# Patient Record
Sex: FEMALE | URBAN - METROPOLITAN AREA
[De-identification: names, ages, dates, MRNs, and addresses within clinical notes are randomized per-mention and may not be internally consistent; named-entity substitution may affect disease eponyms.]

---

## 2023-09-20 ENCOUNTER — APPOINTMENT (RX ONLY)
Dept: URBAN - METROPOLITAN AREA CLINIC 303 | Facility: CLINIC | Age: 33
Setting detail: DERMATOLOGY
End: 2023-09-20

## 2023-09-20 DIAGNOSIS — L98.9 DISORDER OF THE SKIN AND SUBCUTANEOUS TISSUE, UNSPECIFIED: ICD-10-CM

## 2023-09-20 PROCEDURE — 99202 OFFICE O/P NEW SF 15 MIN: CPT

## 2023-09-20 PROCEDURE — ? FULL BODY SKIN EXAM - DECLINED

## 2023-09-20 PROCEDURE — ? ADDITIONAL NOTES

## 2023-09-20 PROCEDURE — ? RECORDS REQUESTED

## 2023-09-20 PROCEDURE — ? COUNSELING

## 2023-09-20 ASSESSMENT — LOCATION DETAILED DESCRIPTION DERM
LOCATION DETAILED: LEFT LATERAL BREAST 5-6:00 REGION
LOCATION DETAILED: LEFT PERIAREOLAR BREAST 1-2:00 REGION
LOCATION DETAILED: LEFT MEDIAL BREAST 8-9:00 REGION

## 2023-09-20 ASSESSMENT — LOCATION ZONE DERM: LOCATION ZONE: TRUNK

## 2023-09-20 ASSESSMENT — LOCATION SIMPLE DESCRIPTION DERM: LOCATION SIMPLE: LEFT BREAST

## 2023-09-20 NOTE — PROCEDURE: COUNSELING
Detail Level: Detailed
Patient Specific Counseling (Will Not Stick From Patient To Patient): No skin findings currently that are c/w allergic reaction

## 2023-09-20 NOTE — HPI: SKIN LESIONS
Is This A New Presentation, Or A Follow-Up?: Skin Lesions
Additional History: Had swelling and pain of left breast. Had mammogram, u/s, told has fluid in breast. Saw breast specialist at [facility]. Told to elevate breast, take ibuprofen. Did help. Flared 7/23 where got red, swollen, painful. Gyn put her on bactrim, saw breast specialist again. Bx done, told showed allergic reaction. About a week later got an abscess left lateral breast while still on abx. Area did drain. Still has swelling and pain that comes and goes left medial breast. Told to see us to eval allergic reaction component. No new contacts, no injections into breast. Area where bx done has healed. No skin changes in area besides scar in bx area.

## 2023-09-20 NOTE — PROCEDURE: ADDITIONAL NOTES
Additional Notes: Recommend she see another breast specialist for second opinion, uncertain why pt having swelling and pain in breast
Render Risk Assessment In Note?: no
Detail Level: Zone

## 2023-09-20 NOTE — PROCEDURE: RECORDS REQUESTED
Preface: I requested the records from the following provider:
Detail Level: Zone
Items Requested Override: pt will email us path report